# Patient Record
Sex: MALE | Race: BLACK OR AFRICAN AMERICAN | NOT HISPANIC OR LATINO | URBAN - METROPOLITAN AREA
[De-identification: names, ages, dates, MRNs, and addresses within clinical notes are randomized per-mention and may not be internally consistent; named-entity substitution may affect disease eponyms.]

---

## 2021-12-12 ENCOUNTER — EMERGENCY (EMERGENCY)
Facility: HOSPITAL | Age: 36
LOS: 1 days | Discharge: HOME | End: 2021-12-12
Admitting: EMERGENCY MEDICINE
Payer: COMMERCIAL

## 2021-12-12 VITALS
HEART RATE: 62 BPM | HEIGHT: 72 IN | RESPIRATION RATE: 18 BRPM | WEIGHT: 158.07 LBS | OXYGEN SATURATION: 97 % | SYSTOLIC BLOOD PRESSURE: 145 MMHG | DIASTOLIC BLOOD PRESSURE: 88 MMHG | TEMPERATURE: 98 F

## 2021-12-12 DIAGNOSIS — M25.562 PAIN IN LEFT KNEE: ICD-10-CM
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
## 2021-12-12 PROCEDURE — 99282 EMERGENCY DEPT VISIT SF MDM: CPT

## 2021-12-12 NOTE — ED PROVIDER NOTE - NSFOLLOWUPINSTRUCTIONS_ED_ALL_ED_FT
Take ibuprofen 600mg up to three times daily for pain.    Elevate and ice when resting.    Wear ACE wrap for additional support.    Follow up with Orthopedics if symptoms fail to improve. See information below. If you have any difficulty obtaining an appointment, please call our Referrals Coordinator at [phone].

## 2021-12-12 NOTE — ED PROVIDER NOTE - PATIENT PORTAL LINK FT
You can access the FollowMyHealth Patient Portal offered by [facility] by registering at the following website: [email or web address]. By joining [facility]’s FollowMyHealth portal, you will also be able to view your health information using other applications (apps) compatible with our system.
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
## 2021-12-12 NOTE — ED PROVIDER NOTE - PHYSICAL EXAMINATION
VITAL SIGNS: I have reviewed nursing notes and confirm.  CONSTITUTIONAL: Well-developed; in no acute distress.   SKIN:  warm and dry, no acute rash.   HEAD:  normocephalic, atraumatic.  EYES: PERRL, EOM intact; conjunctiva and sclera clear.  ENT: No nasal discharge; airway clear.   NECK: Supple; non tender.  CARD: S1, S2 normal; no murmurs, gallops, or rubs. Regular rate and rhythm.   RESP:  Clear to auscultation b/l, no wheezes, rales or rhonchi.  ABD: Normal bowel sounds; soft; non-distended; non-tender; no guarding/ rebound.  EXT: Normal ROM. No clubbing, cyanosis or edema. 2+ pulses to b/l ue/le.  NEURO: Alert, oriented, grossly unremarkable  PSYCH: Cooperative, mood and affect appropriate.    R knee: TTP lateral joint line, no medial joint line tenderness. He has FROM. No pain with varus or valgus. Negative Lachman. No patellar instability. No joint effusion. No calf swelling or redness. 2+ DP pulse. Distal sensation intact.

## 2021-12-12 NOTE — ED PROVIDER NOTE - CLINICAL SUMMARY MEDICAL DECISION MAKING FREE TEXT BOX
Pt has lateral joint line tenderness. He has FROM without joint instability. No calf swelling or redness. His LLE is neurovascularly intact. Offered xray which pt declined. Prefers to try supportive care and follow up with orthopedics if pain does not improve. Return precautions given.

## 2021-12-12 NOTE — ED PROVIDER NOTE - OBJECTIVE STATEMENT
35yo male with no reported pmhx presents with L lateral knee pain x3 days. He reports onset of pain after climbing a ladder at work but denies specific injury. He reports pain with prolonged sitting or ambulating. He states pain radiates from the lateral knee down the lateral aspect of the calf. He denies calf swelling, numbness or weakness. He reports prior meniscal injury in the right knee, denies left knee surgery.

## 2021-12-12 NOTE — ED PROVIDER NOTE - CARE PROVIDER_API CALL
Tejinder Emery)  Orthopaedic Surgery  [address] [place] [address]  Phone: [phone]  Fax: [phone]  Follow Up Time:     Chan Marie)  Orthopaedic Surgery  [address] [place] [address], [address]  [place] [address]  Phone: [phone]  Fax: [phone]  Follow Up Time: